# Patient Record
Sex: FEMALE | Race: OTHER
[De-identification: names, ages, dates, MRNs, and addresses within clinical notes are randomized per-mention and may not be internally consistent; named-entity substitution may affect disease eponyms.]

---

## 2019-11-07 ENCOUNTER — HOSPITAL ENCOUNTER (EMERGENCY)
Dept: HOSPITAL 8 - ED | Age: 19
Discharge: HOME | End: 2019-11-07
Payer: MEDICAID

## 2019-11-07 VITALS — WEIGHT: 189.82 LBS | BODY MASS INDEX: 34.93 KG/M2 | HEIGHT: 62 IN

## 2019-11-07 VITALS — DIASTOLIC BLOOD PRESSURE: 86 MMHG | SYSTOLIC BLOOD PRESSURE: 117 MMHG

## 2019-11-07 DIAGNOSIS — R07.89: Primary | ICD-10-CM

## 2019-11-07 DIAGNOSIS — J20.8: ICD-10-CM

## 2019-11-07 PROCEDURE — 36415 COLL VENOUS BLD VENIPUNCTURE: CPT

## 2019-11-07 PROCEDURE — 99284 EMERGENCY DEPT VISIT MOD MDM: CPT

## 2019-11-07 PROCEDURE — 71046 X-RAY EXAM CHEST 2 VIEWS: CPT

## 2019-11-07 PROCEDURE — 93005 ELECTROCARDIOGRAM TRACING: CPT

## 2019-11-07 PROCEDURE — 84703 CHORIONIC GONADOTROPIN ASSAY: CPT

## 2019-11-07 PROCEDURE — 85379 FIBRIN DEGRADATION QUANT: CPT

## 2019-11-07 NOTE — NUR
VS UPDATED.  EKG HAD BEEN COMPLETED PRIOR TO THIS RN TAKING OVER AFTER 
RECEIVING REPORT FROM [facility] AT APPROXIMATELY 2200.  PT. WITH NO DISTRESS 
NOTED.  AWAITING PROVIDER RECHECK.

## 2020-02-27 ENCOUNTER — HOSPITAL ENCOUNTER (EMERGENCY)
Dept: HOSPITAL 8 - ED | Age: 20
Discharge: HOME | End: 2020-02-27
Payer: MEDICAID

## 2020-02-27 VITALS — HEIGHT: 62 IN | WEIGHT: 188.27 LBS | BODY MASS INDEX: 34.65 KG/M2

## 2020-02-27 VITALS — SYSTOLIC BLOOD PRESSURE: 106 MMHG | DIASTOLIC BLOOD PRESSURE: 61 MMHG

## 2020-02-27 DIAGNOSIS — J15.9: Primary | ICD-10-CM

## 2020-02-27 DIAGNOSIS — M19.90: ICD-10-CM

## 2020-02-27 PROCEDURE — 94640 AIRWAY INHALATION TREATMENT: CPT

## 2020-02-27 PROCEDURE — 99283 EMERGENCY DEPT VISIT LOW MDM: CPT

## 2020-02-27 PROCEDURE — 71046 X-RAY EXAM CHEST 2 VIEWS: CPT

## 2021-01-03 ENCOUNTER — HOSPITAL ENCOUNTER (EMERGENCY)
Dept: HOSPITAL 8 - ED | Age: 21
Discharge: HOME | End: 2021-01-03
Payer: MEDICAID

## 2021-01-03 VITALS — DIASTOLIC BLOOD PRESSURE: 80 MMHG | SYSTOLIC BLOOD PRESSURE: 130 MMHG

## 2021-01-03 VITALS — BODY MASS INDEX: 37.49 KG/M2 | WEIGHT: 203.71 LBS | HEIGHT: 62 IN

## 2021-01-03 DIAGNOSIS — K02.9: ICD-10-CM

## 2021-01-03 DIAGNOSIS — K04.7: Primary | ICD-10-CM

## 2021-01-03 PROCEDURE — 64400 NJX AA&/STRD TRIGEMINAL NRV: CPT

## 2021-01-03 PROCEDURE — 99284 EMERGENCY DEPT VISIT MOD MDM: CPT
